# Patient Record
Sex: MALE | Race: OTHER | Employment: FULL TIME | URBAN - NONMETROPOLITAN AREA
[De-identification: names, ages, dates, MRNs, and addresses within clinical notes are randomized per-mention and may not be internally consistent; named-entity substitution may affect disease eponyms.]

---

## 2021-09-27 ENCOUNTER — HOSPITAL ENCOUNTER (INPATIENT)
Age: 36
LOS: 4 days | Discharge: HOME | DRG: 720 | End: 2021-10-01
Attending: EMERGENCY MEDICINE | Admitting: HOSPITALIST
Payer: MEDICAID

## 2021-09-27 ENCOUNTER — APPOINTMENT (OUTPATIENT)
Dept: GENERAL RADIOLOGY | Age: 36
DRG: 720 | End: 2021-09-27
Payer: MEDICAID

## 2021-09-27 DIAGNOSIS — U07.1 ACUTE RESPIRATORY FAILURE DUE TO COVID-19 (HCC): Primary | ICD-10-CM

## 2021-09-27 DIAGNOSIS — J96.00 ACUTE RESPIRATORY FAILURE DUE TO COVID-19 (HCC): Primary | ICD-10-CM

## 2021-09-27 LAB
ALBUMIN SERPL-MCNC: 4 G/DL (ref 3.5–5.1)
ALP BLD-CCNC: 72 U/L (ref 38–126)
ALT SERPL-CCNC: 37 U/L (ref 11–66)
ANION GAP SERPL CALCULATED.3IONS-SCNC: 13 MEQ/L (ref 8–16)
AST SERPL-CCNC: 30 U/L (ref 5–40)
BACTERIA: ABNORMAL /HPF
BASOPHILS # BLD: 0.1 %
BASOPHILS ABSOLUTE: 0 THOU/MM3 (ref 0–0.1)
BILIRUB SERPL-MCNC: 0.6 MG/DL (ref 0.3–1.2)
BILIRUBIN DIRECT: < 0.2 MG/DL (ref 0–0.3)
BILIRUBIN URINE: NEGATIVE
BLOOD, URINE: NEGATIVE
BUN BLDV-MCNC: 19 MG/DL (ref 7–22)
C-REACTIVE PROTEIN: 2.51 MG/DL (ref 0–1)
CALCIUM SERPL-MCNC: 9 MG/DL (ref 8.5–10.5)
CASTS 2: ABNORMAL /LPF
CASTS UA: ABNORMAL /LPF
CHARACTER, URINE: CLEAR
CHLORIDE BLD-SCNC: 100 MEQ/L (ref 98–111)
CO2: 24 MEQ/L (ref 23–33)
COLOR: YELLOW
CREAT SERPL-MCNC: 0.7 MG/DL (ref 0.4–1.2)
CRYSTALS, UA: ABNORMAL
EOSINOPHIL # BLD: 0 %
EOSINOPHILS ABSOLUTE: 0 THOU/MM3 (ref 0–0.4)
EPITHELIAL CELLS, UA: ABNORMAL /HPF
ERYTHROCYTE [DISTWIDTH] IN BLOOD BY AUTOMATED COUNT: 12.6 % (ref 11.5–14.5)
ERYTHROCYTE [DISTWIDTH] IN BLOOD BY AUTOMATED COUNT: 40.4 FL (ref 35–45)
FERRITIN: 916 NG/ML (ref 22–322)
GFR SERPL CREATININE-BSD FRML MDRD: > 90 ML/MIN/1.73M2
GLUCOSE BLD-MCNC: 183 MG/DL (ref 70–108)
GLUCOSE URINE: NEGATIVE MG/DL
HCT VFR BLD CALC: 47.5 % (ref 42–52)
HEMOGLOBIN: 16.7 GM/DL (ref 14–18)
IMMATURE GRANS (ABS): 0.05 THOU/MM3 (ref 0–0.07)
IMMATURE GRANULOCYTES: 0.5 %
KETONES, URINE: NEGATIVE
LACTIC ACID, SEPSIS: 1.7 MMOL/L (ref 0.5–1.9)
LACTIC ACID, SEPSIS: 2.6 MMOL/L (ref 0.5–1.9)
LACTIC ACID: 3.4 MMOL/L (ref 0.5–2)
LD: 357 U/L (ref 100–190)
LEUKOCYTE ESTERASE, URINE: NEGATIVE
LYMPHOCYTES # BLD: 7.4 %
LYMPHOCYTES ABSOLUTE: 0.7 THOU/MM3 (ref 1–4.8)
MCH RBC QN AUTO: 30.8 PG (ref 26–33)
MCHC RBC AUTO-ENTMCNC: 35.2 GM/DL (ref 32.2–35.5)
MCV RBC AUTO: 87.5 FL (ref 80–94)
MISCELLANEOUS 2: ABNORMAL
MONOCYTES # BLD: 4.1 %
MONOCYTES ABSOLUTE: 0.4 THOU/MM3 (ref 0.4–1.3)
NITRITE, URINE: NEGATIVE
NUCLEATED RED BLOOD CELLS: 0 /100 WBC
OSMOLALITY CALCULATION: 280.8 MOSMOL/KG (ref 275–300)
PH UA: 8.5 (ref 5–9)
PLATELET # BLD: 165 THOU/MM3 (ref 130–400)
PMV BLD AUTO: 10.4 FL (ref 9.4–12.4)
POTASSIUM REFLEX MAGNESIUM: 3.9 MEQ/L (ref 3.5–5.2)
PRO-BNP: 37.2 PG/ML (ref 0–450)
PROCALCITONIN: 0.08 NG/ML (ref 0.01–0.09)
PROTEIN UA: 30
RBC # BLD: 5.43 MILL/MM3 (ref 4.7–6.1)
RBC URINE: ABNORMAL /HPF
RENAL EPITHELIAL, UA: ABNORMAL
SARS-COV-2, NAAT: DETECTED
SEG NEUTROPHILS: 87.9 %
SEGMENTED NEUTROPHILS ABSOLUTE COUNT: 8.9 THOU/MM3 (ref 1.8–7.7)
SODIUM BLD-SCNC: 137 MEQ/L (ref 135–145)
SPECIFIC GRAVITY, URINE: 1.02 (ref 1–1.03)
TOTAL PROTEIN: 7.7 G/DL (ref 6.1–8)
TROPONIN T: < 0.01 NG/ML
UROBILINOGEN, URINE: 1 EU/DL (ref 0–1)
WBC # BLD: 10.1 THOU/MM3 (ref 4.8–10.8)
WBC UA: ABNORMAL /HPF
YEAST: ABNORMAL

## 2021-09-27 PROCEDURE — 36415 COLL VENOUS BLD VENIPUNCTURE: CPT

## 2021-09-27 PROCEDURE — 87635 SARS-COV-2 COVID-19 AMP PRB: CPT

## 2021-09-27 PROCEDURE — 83615 LACTATE (LD) (LDH) ENZYME: CPT

## 2021-09-27 PROCEDURE — 80076 HEPATIC FUNCTION PANEL: CPT

## 2021-09-27 PROCEDURE — 80048 BASIC METABOLIC PNL TOTAL CA: CPT

## 2021-09-27 PROCEDURE — 99232 SBSQ HOSP IP/OBS MODERATE 35: CPT | Performed by: PHYSICIAN ASSISTANT

## 2021-09-27 PROCEDURE — 83605 ASSAY OF LACTIC ACID: CPT

## 2021-09-27 PROCEDURE — 85025 COMPLETE CBC W/AUTO DIFF WBC: CPT

## 2021-09-27 PROCEDURE — 81001 URINALYSIS AUTO W/SCOPE: CPT

## 2021-09-27 PROCEDURE — 84484 ASSAY OF TROPONIN QUANT: CPT

## 2021-09-27 PROCEDURE — 82728 ASSAY OF FERRITIN: CPT

## 2021-09-27 PROCEDURE — 2580000003 HC RX 258: Performed by: EMERGENCY MEDICINE

## 2021-09-27 PROCEDURE — 86140 C-REACTIVE PROTEIN: CPT

## 2021-09-27 PROCEDURE — 99285 EMERGENCY DEPT VISIT HI MDM: CPT

## 2021-09-27 PROCEDURE — 1200000003 HC TELEMETRY R&B

## 2021-09-27 PROCEDURE — 6360000002 HC RX W HCPCS: Performed by: PHYSICIAN ASSISTANT

## 2021-09-27 PROCEDURE — 6370000000 HC RX 637 (ALT 250 FOR IP): Performed by: PHYSICIAN ASSISTANT

## 2021-09-27 PROCEDURE — 84145 PROCALCITONIN (PCT): CPT

## 2021-09-27 PROCEDURE — 83880 ASSAY OF NATRIURETIC PEPTIDE: CPT

## 2021-09-27 PROCEDURE — 93005 ELECTROCARDIOGRAM TRACING: CPT | Performed by: EMERGENCY MEDICINE

## 2021-09-27 PROCEDURE — 96374 THER/PROPH/DIAG INJ IV PUSH: CPT

## 2021-09-27 PROCEDURE — 6360000002 HC RX W HCPCS: Performed by: EMERGENCY MEDICINE

## 2021-09-27 PROCEDURE — 71045 X-RAY EXAM CHEST 1 VIEW: CPT

## 2021-09-27 RX ORDER — MELOXICAM 7.5 MG/1
15 TABLET ORAL DAILY
Status: DISCONTINUED | OUTPATIENT
Start: 2021-09-27 | End: 2021-10-01 | Stop reason: HOSPADM

## 2021-09-27 RX ORDER — DEXAMETHASONE SODIUM PHOSPHATE 4 MG/ML
10 INJECTION, SOLUTION INTRA-ARTICULAR; INTRALESIONAL; INTRAMUSCULAR; INTRAVENOUS; SOFT TISSUE ONCE
Status: COMPLETED | OUTPATIENT
Start: 2021-09-27 | End: 2021-09-27

## 2021-09-27 RX ORDER — DEXAMETHASONE SODIUM PHOSPHATE 4 MG/ML
6 INJECTION, SOLUTION INTRA-ARTICULAR; INTRALESIONAL; INTRAMUSCULAR; INTRAVENOUS; SOFT TISSUE EVERY 24 HOURS
Status: DISCONTINUED | OUTPATIENT
Start: 2021-09-28 | End: 2021-09-29

## 2021-09-27 RX ORDER — CYCLOBENZAPRINE HCL 10 MG
10 TABLET ORAL 3 TIMES DAILY PRN
COMMUNITY

## 2021-09-27 RX ORDER — CYCLOBENZAPRINE HCL 10 MG
10 TABLET ORAL 3 TIMES DAILY PRN
Status: DISCONTINUED | OUTPATIENT
Start: 2021-09-27 | End: 2021-10-01 | Stop reason: HOSPADM

## 2021-09-27 RX ORDER — ACETAMINOPHEN 325 MG/1
650 TABLET ORAL EVERY 6 HOURS PRN
Status: DISCONTINUED | OUTPATIENT
Start: 2021-09-27 | End: 2021-10-01 | Stop reason: HOSPADM

## 2021-09-27 RX ORDER — DEXAMETHASONE 4 MG/1
4 TABLET ORAL 2 TIMES DAILY WITH MEALS
Status: ON HOLD | COMMUNITY
End: 2021-10-01 | Stop reason: HOSPADM

## 2021-09-27 RX ORDER — MULTIVIT-MIN/IRON/FOLIC ACID/K 18-600-40
CAPSULE ORAL
COMMUNITY

## 2021-09-27 RX ORDER — AZITHROMYCIN 250 MG/1
250 TABLET, FILM COATED ORAL DAILY
Status: COMPLETED | OUTPATIENT
Start: 2021-09-28 | End: 2021-09-29

## 2021-09-27 RX ORDER — ZINC SULFATE 50(220)MG
50 CAPSULE ORAL DAILY
Status: DISCONTINUED | OUTPATIENT
Start: 2021-09-27 | End: 2021-10-01 | Stop reason: HOSPADM

## 2021-09-27 RX ORDER — ASCORBIC ACID 500 MG
500 TABLET ORAL DAILY
Status: DISCONTINUED | OUTPATIENT
Start: 2021-09-27 | End: 2021-10-01 | Stop reason: HOSPADM

## 2021-09-27 RX ORDER — MELOXICAM 15 MG/1
15 TABLET ORAL DAILY
COMMUNITY

## 2021-09-27 RX ORDER — ACETAMINOPHEN 650 MG/1
650 SUPPOSITORY RECTAL EVERY 6 HOURS PRN
Status: DISCONTINUED | OUTPATIENT
Start: 2021-09-27 | End: 2021-10-01 | Stop reason: HOSPADM

## 2021-09-27 RX ORDER — VITAMIN B COMPLEX
2000 TABLET ORAL DAILY
Status: DISCONTINUED | OUTPATIENT
Start: 2021-09-27 | End: 2021-10-01 | Stop reason: HOSPADM

## 2021-09-27 RX ORDER — AZITHROMYCIN 250 MG/1
250 TABLET, FILM COATED ORAL DAILY
Status: ON HOLD | COMMUNITY
End: 2021-10-01 | Stop reason: HOSPADM

## 2021-09-27 RX ORDER — SODIUM CHLORIDE 9 MG/ML
1000 INJECTION, SOLUTION INTRAVENOUS CONTINUOUS
Status: DISCONTINUED | OUTPATIENT
Start: 2021-09-27 | End: 2021-09-28

## 2021-09-27 RX ADMIN — Medication 2000 UNITS: at 20:34

## 2021-09-27 RX ADMIN — BARICITINIB 4 MG: 2 TABLET, FILM COATED ORAL at 20:34

## 2021-09-27 RX ADMIN — MELOXICAM 15 MG: 7.5 TABLET ORAL at 20:34

## 2021-09-27 RX ADMIN — SODIUM CHLORIDE 1000 ML: 9 INJECTION, SOLUTION INTRAVENOUS at 11:42

## 2021-09-27 RX ADMIN — OXYCODONE HYDROCHLORIDE AND ACETAMINOPHEN 500 MG: 500 TABLET ORAL at 20:34

## 2021-09-27 RX ADMIN — ENOXAPARIN SODIUM 30 MG: 30 INJECTION SUBCUTANEOUS at 20:35

## 2021-09-27 RX ADMIN — Medication 50 MG: at 20:34

## 2021-09-27 RX ADMIN — DEXAMETHASONE SODIUM PHOSPHATE 10 MG: 4 INJECTION, SOLUTION INTRAMUSCULAR; INTRAVENOUS at 11:42

## 2021-09-27 ASSESSMENT — ENCOUNTER SYMPTOMS
NAUSEA: 0
SHORTNESS OF BREATH: 1
SINUS PRESSURE: 0
VOMITING: 0
ABDOMINAL PAIN: 0
BACK PAIN: 0
COUGH: 1
COLOR CHANGE: 0
CHEST TIGHTNESS: 0
SORE THROAT: 0
EYE REDNESS: 0
PHOTOPHOBIA: 0

## 2021-09-27 ASSESSMENT — PAIN SCALES - GENERAL: PAINLEVEL_OUTOF10: 0

## 2021-09-27 NOTE — ED NOTES
ED to inpatient nurses report    Chief Complaint   Patient presents with    Shortness of Breath      Present to ED from home  LOC: alert and orientated to name, place, date  Vital signs   Vitals:    09/27/21 1043 09/27/21 1052 09/27/21 1141   BP: (!) 149/83 128/85 125/83   Pulse: 102 105 99   Resp: (!) 32 28 25   Temp: 98.8 °F (37.1 °C)     TempSrc: Oral     SpO2: (!) 86% 93% 94%   Weight:  200 lb (90.7 kg)    Height:  5' 7\" (1.702 m)       Oxygen Baseline room air     Current needs required 3L per NC  Bipap/Cpap No  LDAs:   Peripheral IV 09/27/21 Left Forearm (Active)   Site Assessment Clean;Dry; Intact 09/27/21 1053   Line Status Blood return noted; Flushed;Normal saline locked;Specimen collected 09/27/21 1053   Dressing Status Clean;Dry; Intact 09/27/21 1053     Mobility: Requires assistance * 1  Pending ED orders: n/a   Present condition: pt stable   Person of contract Lisa Novak , phone number [phone]  Our promise was given to patient    Electronically signed by Ester Finney RN on 9/27/2021 at 12:22 PM     Ester Finney RN  09/27/21 8051 [facility]  09/27/21 1542

## 2021-09-27 NOTE — H&P
Hospitalist History & Physical    Patient:  [facility]    Unit/Bed:01/001A  YOB: 1985  MRN: [id number]   Acct: [de-identified]   PCP: RIKKI Hernandez CNP  Code Status: No Order    Date of Service: Pt seen/examined on 09/27/21 and admitted to Inpatient with expected LOS greater than two midnights due to medical therapy. Chief Complaint: SOB    Assessment/Plan:    1. Acute hypoxic respiratory failure d/t COVID-19 pneumonia:   Tested positive on 9/27  Dexamethasone 6mg daily (start date 9/27; Day 1/10)  Vit C/D/Zinc, ASA. BID Lovenox. Pt is not a candidate for Regeneron d/t hypoxia. Symptom onset > 7 days ago - does not meet criteria for Remdesivir  Elevated inflammatory markers - Baricitnib 4mg daily (start date 9/27; Day 1/14). Cough suppressants, albuterol nebs, IS. Wean O2 as tolerated. Currently on 4L NC. History of Present Illness: This is a 31year old male with no significant PMHx who presented to [facility] with SOB and found to be positive for COVID-19. Patient states that his symptoms started approx 8 days ago with cough, fever/chills, fatigue, body aches. He states that this morning he suddenly became very short of breath and came to the hospital. Denies chest pain, abd pain, n/v/d, urinary sx. Upon arrival to the ED, pts SpO2 was 86% and he was placed on 3L NC. Patient will be admitted to hospitalist service for further management. Review of Systems: Pertinent positives as noted in the HPI. All other systems reviewed and negative. Past Medical History:    History reviewed. No pertinent past medical history. Past Surgical History:    History reviewed. No pertinent surgical history. Home Medications:   No current facility-administered medications on file prior to encounter.      Current Outpatient Medications on File Prior to Encounter   Medication Sig Dispense Refill    meloxicam (MOBIC) 15 MG tablet Take 15 mg by mouth daily      Cholecalciferol

## 2021-09-27 NOTE — ED NOTES
Patient resting on cot with eyes closed, respirations are easy and unlabored. No needs at this time.       Bronwyn Sampson RN  09/27/21 9620

## 2021-09-27 NOTE — ED PROVIDER NOTES
[place] ENCOUNTER          Pt Name: Chris Amaya  MRN: [id number]  [place] 1985  Date of evaluation: 9/27/2021  Emergency Physician: Aditya Rojas DO    CHIEF COMPLAINT       Chief Complaint   Patient presents with    Shortness of Breath     History obtained from the patient and his wife. HISTORY OF PRESENT ILLNESS    HPI  Chris Amaya is a [address] y.o. male who presents to the emergency department for evaluation of shortness of breath. Patient with COVID-19 for the last 7 days. He reports being seen by primary care and [facility] 2 days ago. He had been started on dexamethasone Zithromax vitamin C and vitamin D. He reports today he has been having worsening shortness of breath and difficulty breathing. He states he gets into coughing spells in which he cannot catch his breath and becomes more short of breath. Cough is nonproductive. No hemoptysis. Reports fatigue and shortness of breath with exertion. Denies pain. Upon arrival to the ED patient's SPO2 was 86% on room air. The patient has no other acute complaints at this time. REVIEW OF SYSTEMS   Review of Systems   Constitutional: Negative for activity change, chills and fever. HENT: Negative for congestion, sinus pressure and sore throat. Eyes: Negative for photophobia, redness and visual disturbance. Respiratory: Positive for cough and shortness of breath. Negative for chest tightness. Cardiovascular: Negative for chest pain, palpitations and leg swelling. Gastrointestinal: Negative for abdominal pain, nausea and vomiting. Endocrine: Negative for polydipsia and polyuria. Genitourinary: Negative for decreased urine volume, difficulty urinating, dysuria, flank pain, frequency and urgency. Musculoskeletal: Negative for back pain, myalgias and neck pain. Skin: Negative for color change and rash.    Neurological: Negative for weakness and headaches. Hematological: Negative for adenopathy. Does not bruise/bleed easily. Psychiatric/Behavioral: Negative for confusion and self-injury. PAST MEDICAL AND SURGICAL HISTORY   History reviewed. No pertinent past medical history. History reviewed. No pertinent surgical history. MEDICATIONS     Current Facility-Administered Medications:     0.9 % sodium chloride infusion, 1,000 mL, IntraVENous, Continuous, Mouna Rotunda, DO    Dexamethasone Sodium Phosphate injection 10 mg, 10 mg, IntraVENous, Once, Mouna Rotunda, DO    Current Outpatient Medications:     meloxicam (MOBIC) 15 MG tablet, Take 15 mg by mouth daily, Disp: , Rfl:     Cholecalciferol (VITAMIN D) 50 MCG (2000 UT) CAPS capsule, Take by mouth, Disp: , Rfl:     cyclobenzaprine (FLEXERIL) 10 MG tablet, Take 10 mg by mouth 3 times daily as needed for Muscle spasms, Disp: , Rfl:     dexamethasone (DECADRON) 4 MG tablet, Take 4 mg by mouth 2 times daily (with meals), Disp: , Rfl:     azithromycin (ZITHROMAX) 250 MG tablet, Take 250 mg by mouth daily Follow direction on box, Disp: , Rfl:       SOCIAL HISTORY     Social History     Social History Narrative    Not on file     Social History     Tobacco Use    Smoking status: Never Smoker   Substance Use Topics    Alcohol use: Not Currently    Drug use: Not Currently         ALLERGIES   No Known Allergies      FAMILY HISTORY   History reviewed. No pertinent family history. PHYSICAL EXAM     ED Triage Vitals   BP Temp Temp Source Pulse Resp SpO2 Height Weight   09/27/21 1043 09/27/21 1043 09/27/21 1043 09/27/21 1043 09/27/21 1043 09/27/21 1043 09/27/21 1052 09/27/21 1052   (!) 149/83 98.8 °F (37.1 °C) Oral 102 (!) 32 (!) 86 % 5' 7\" (1.702 m) 200 lb (90.7 kg)         Additional Vital Signs:  Vitals:    09/27/21 1052   BP: 128/85   Pulse: 105   Resp: 28   Temp:    SpO2: 93%       Physical Exam  Vitals and nursing note reviewed.    Constitutional:       General: He is not in acute distress. Appearance: He is well-developed. He is not diaphoretic. HENT:      Head: Normocephalic. Eyes:      Pupils: Pupils are equal, round, and reactive to light. Neck:      Vascular: No JVD. Cardiovascular:      Rate and Rhythm: Normal rate and regular rhythm. Heart sounds: Normal heart sounds. Pulmonary:      Effort: Pulmonary effort is normal. Tachypnea present. No accessory muscle usage or respiratory distress. Breath sounds: Rales present. Abdominal:      General: Bowel sounds are normal. There is no distension. Palpations: Abdomen is soft. Tenderness: There is no abdominal tenderness. Musculoskeletal:         General: No tenderness or deformity. Normal range of motion. Cervical back: Normal range of motion and neck supple. Skin:     General: Skin is warm and dry. Capillary Refill: Capillary refill takes less than 2 seconds. Neurological:      Mental Status: He is alert and oriented to person, place, and time. Comments: Non-focal. Moves all extremities. Initial vital signs and nursing assessment reviewed and abnormal from Tachypnea. Pulsoximetry is abnormal per my interpretation. MEDICAL DECISION MAKING   Initial Assessment: Given the patient's above chief complaint and findings on history and physical examination, I thought it was appropriate to consider the following emergency medical conditions: Pneumonia, Covid pneumonia, bronchitis, pulmonary vascular pneumothorax, pulmonary edema although some of these diagnoses are unlikely they were considered in my medical decision making. Plan: CBC, BMP, ECG, troponin, BNP lactate, CRP symptomatic treatment with gentle hydration, Decadron and reassess   Unable to obtain [facility] COVID-19 swab. Will rapid Covid swab.     ED RESULTS   Laboratory results:  Labs Reviewed   CBC WITH AUTO DIFFERENTIAL - Abnormal; Notable for the following components:       Result Value    Segs Absolute 8.9 (*) Lymphocytes Absolute 0.7 (*)     All other components within normal limits   BASIC METABOLIC PANEL W/ REFLEX TO MG FOR LOW K - Abnormal; Notable for the following components:    Glucose 183 (*)     All other components within normal limits   LACTATE, SEPSIS - Abnormal; Notable for the following components:    Lactic Acid, Sepsis 2.6 (*)     All other components within normal limits   FERRITIN - Abnormal; Notable for the following components:    Ferritin 916 (*)     All other components within normal limits   C-REACTIVE PROTEIN - Abnormal; Notable for the following components:    CRP 2.51 (*)     All other components within normal limits   LACTATE DEHYDROGENASE - Abnormal; Notable for the following components:     (*)     All other components within normal limits   COVID-19, RAPID   HEPATIC FUNCTION PANEL   TROPONIN   BRAIN NATRIURETIC PEPTIDE   ANION GAP   GLOMERULAR FILTRATION RATE, ESTIMATED   OSMOLALITY   LACTATE, SEPSIS   URINE RT REFLEX TO CULTURE       Radiologic studies results:  XR CHEST PORTABLE   Final Result   1. Nodular peripheral predominant airspace opacities compatible with atypical infection. **This report has been created using voice recognition software. It may contain minor errors which are inherent in voice recognition technology. **      Final report electronically signed by Dr Geronimo Altman on 9/27/2021 11:27 AM          ED Medications administered this visit:   Medications   0.9 % sodium chloride infusion (has no administration in time range)   Dexamethasone Sodium Phosphate injection 10 mg (has no administration in time range)         ED COURSE    Patient with hypoxia tachypnea significantly improved with nasal cannula O2. Chest x-ray with bilateral infiltrates. Patient administered Decadron due to acute respiratory failure likely due to COVID-19. Patient's wife tested positive for COVID-19. Obtained rapid swab to confirm diagnosis as unable to confirm record.  Discussed case with hospitalist. Dr. Ariella Jaramillo accepted admission. The diagnosis, extensive differential diagnosis, laboratory and imaging findings were discussed at the bedside. The patient was an active participant in their care. They are agreeable to the plan of care. All questions and concerns were addressed at the time of the encounter. MEDICATION CHANGES     DISCHARGE MEDICATIONS:  New Prescriptions    No medications on file            FINAL DISPOSITION     Final diagnoses:   Acute respiratory failure due to COVID-19 [facility])     Condition: condition: fair  Dispo: Admit to telemetry    PATIENT REFERRED TO:  No follow-up provider specified. Critical Care Time   Critical Care Diagnosis. Acute respiratory failure with hypoxia. Requiring frequent monitoring and reassessment during ED course. There was a high probability of clinically significant/life threatening deterioration in this patient's condition which required my urgent intervention. Total critical care time was 20 minutes. This excludes any time for separately reportable procedures. This transcription was electronically signed. Parts of this transcriptions may have been dictated by use of voice recognition software and electronically transcribed, and parts may have been transcribed with the assistance of an ED scribe. The transcription may contain errors not detected in proofreading.     Electronically Signed: Nina Sanders DO, 09/27/21, 11:20 AM      Nina Sanders DO  09/27/21 4018

## 2021-09-28 LAB
ANION GAP SERPL CALCULATED.3IONS-SCNC: 15 MEQ/L (ref 8–16)
BUN BLDV-MCNC: 20 MG/DL (ref 7–22)
CALCIUM SERPL-MCNC: 9 MG/DL (ref 8.5–10.5)
CHLORIDE BLD-SCNC: 102 MEQ/L (ref 98–111)
CO2: 23 MEQ/L (ref 23–33)
CREAT SERPL-MCNC: 0.6 MG/DL (ref 0.4–1.2)
EKG ATRIAL RATE: 101 BPM
EKG P AXIS: 54 DEGREES
EKG P-R INTERVAL: 130 MS
EKG Q-T INTERVAL: 328 MS
EKG QRS DURATION: 104 MS
EKG QTC CALCULATION (BAZETT): 425 MS
EKG R AXIS: 59 DEGREES
EKG T AXIS: 53 DEGREES
EKG VENTRICULAR RATE: 101 BPM
ERYTHROCYTE [DISTWIDTH] IN BLOOD BY AUTOMATED COUNT: 12.7 % (ref 11.5–14.5)
ERYTHROCYTE [DISTWIDTH] IN BLOOD BY AUTOMATED COUNT: 41.6 FL (ref 35–45)
GFR SERPL CREATININE-BSD FRML MDRD: > 90 ML/MIN/1.73M2
GLUCOSE BLD-MCNC: 125 MG/DL (ref 70–108)
HCT VFR BLD CALC: 45.8 % (ref 42–52)
HEMOGLOBIN: 15.7 GM/DL (ref 14–18)
LACTIC ACID: 1 MMOL/L (ref 0.5–2)
MCH RBC QN AUTO: 30.7 PG (ref 26–33)
MCHC RBC AUTO-ENTMCNC: 34.3 GM/DL (ref 32.2–35.5)
MCV RBC AUTO: 89.5 FL (ref 80–94)
PLATELET # BLD: 166 THOU/MM3 (ref 130–400)
PMV BLD AUTO: 10.3 FL (ref 9.4–12.4)
POTASSIUM SERPL-SCNC: 4.2 MEQ/L (ref 3.5–5.2)
RBC # BLD: 5.12 MILL/MM3 (ref 4.7–6.1)
SODIUM BLD-SCNC: 140 MEQ/L (ref 135–145)
WBC # BLD: 13.8 THOU/MM3 (ref 4.8–10.8)

## 2021-09-28 PROCEDURE — 83605 ASSAY OF LACTIC ACID: CPT

## 2021-09-28 PROCEDURE — 36415 COLL VENOUS BLD VENIPUNCTURE: CPT

## 2021-09-28 PROCEDURE — 80048 BASIC METABOLIC PNL TOTAL CA: CPT

## 2021-09-28 PROCEDURE — 2700000000 HC OXYGEN THERAPY PER DAY

## 2021-09-28 PROCEDURE — 99232 SBSQ HOSP IP/OBS MODERATE 35: CPT | Performed by: PHYSICIAN ASSISTANT

## 2021-09-28 PROCEDURE — 2060000000 HC ICU INTERMEDIATE R&B

## 2021-09-28 PROCEDURE — 6370000000 HC RX 637 (ALT 250 FOR IP): Performed by: PHYSICIAN ASSISTANT

## 2021-09-28 PROCEDURE — 93010 ELECTROCARDIOGRAM REPORT: CPT | Performed by: INTERNAL MEDICINE

## 2021-09-28 PROCEDURE — 2580000003 HC RX 258: Performed by: PHYSICIAN ASSISTANT

## 2021-09-28 PROCEDURE — 1200000003 HC TELEMETRY R&B

## 2021-09-28 PROCEDURE — 94761 N-INVAS EAR/PLS OXIMETRY MLT: CPT

## 2021-09-28 PROCEDURE — 6360000002 HC RX W HCPCS: Performed by: PHYSICIAN ASSISTANT

## 2021-09-28 PROCEDURE — 85027 COMPLETE CBC AUTOMATED: CPT

## 2021-09-28 RX ORDER — IPRATROPIUM BROMIDE AND ALBUTEROL SULFATE 2.5; .5 MG/3ML; MG/3ML
1 SOLUTION RESPIRATORY (INHALATION) EVERY 4 HOURS PRN
Status: DISCONTINUED | OUTPATIENT
Start: 2021-09-28 | End: 2021-10-01 | Stop reason: HOSPADM

## 2021-09-28 RX ORDER — BENZONATATE 100 MG/1
100 CAPSULE ORAL 3 TIMES DAILY PRN
Status: DISCONTINUED | OUTPATIENT
Start: 2021-09-28 | End: 2021-10-01 | Stop reason: HOSPADM

## 2021-09-28 RX ORDER — SODIUM CHLORIDE 9 MG/ML
INJECTION, SOLUTION INTRAVENOUS CONTINUOUS
Status: DISCONTINUED | OUTPATIENT
Start: 2021-09-28 | End: 2021-10-01 | Stop reason: HOSPADM

## 2021-09-28 RX ADMIN — AZITHROMYCIN 250 MG: 250 TABLET, FILM COATED ORAL at 07:51

## 2021-09-28 RX ADMIN — ENOXAPARIN SODIUM 30 MG: 30 INJECTION SUBCUTANEOUS at 21:23

## 2021-09-28 RX ADMIN — OXYCODONE HYDROCHLORIDE AND ACETAMINOPHEN 500 MG: 500 TABLET ORAL at 07:51

## 2021-09-28 RX ADMIN — DEXAMETHASONE SODIUM PHOSPHATE 6 MG: 4 INJECTION, SOLUTION INTRA-ARTICULAR; INTRALESIONAL; INTRAMUSCULAR; INTRAVENOUS; SOFT TISSUE at 07:51

## 2021-09-28 RX ADMIN — GUAIFENESIN AND DEXTROMETHORPHAN HYDROBROMIDE 1 TABLET: 600; 30 TABLET, EXTENDED RELEASE ORAL at 21:23

## 2021-09-28 RX ADMIN — BARICITINIB 4 MG: 2 TABLET, FILM COATED ORAL at 21:23

## 2021-09-28 RX ADMIN — MELOXICAM 15 MG: 7.5 TABLET ORAL at 07:51

## 2021-09-28 RX ADMIN — Medication 2000 UNITS: at 07:51

## 2021-09-28 RX ADMIN — SODIUM CHLORIDE: 9 INJECTION, SOLUTION INTRAVENOUS at 18:45

## 2021-09-28 RX ADMIN — ENOXAPARIN SODIUM 30 MG: 30 INJECTION SUBCUTANEOUS at 07:51

## 2021-09-28 RX ADMIN — BENZONATATE 100 MG: 100 CAPSULE ORAL at 21:23

## 2021-09-28 RX ADMIN — Medication 50 MG: at 07:51

## 2021-09-28 ASSESSMENT — PAIN SCALES - GENERAL: PAINLEVEL_OUTOF10: 0

## 2021-09-28 NOTE — PROGRESS NOTES
Scheduled Medications    enoxaparin  30 mg SubCUTAneous BID    dexamethasone  6 mg IntraVENous Q24H    Vitamin D  2,000 Units Oral Daily    ascorbic acid  500 mg Oral Daily    zinc sulfate  50 mg Oral Daily    azithromycin  250 mg Oral Daily    meloxicam  15 mg Oral Daily    baricitinib  4 mg Oral Daily     PRN Meds: acetaminophen **OR** acetaminophen, cyclobenzaprine    I/O:   No intake or output data in the 24 hours ending 09/28/21 1831    Diet:  ADULT DIET; Regular    Exam:  /76   Pulse 90   Temp 97.6 °F (36.4 °C) (Oral)   Resp 20   Ht 5' 7\" (1.702 m)   Wt 179 lb 10.8 oz (81.5 kg)   SpO2 93%   BMI 28.14 kg/m²   General:   Acutely ill appearing male. NAD. NC in place. HEENT:  normocephalic and atraumatic. No scleral icterus. PERR. Neck: supple. No JVD. No thyromegaly. Lungs: clear to auscultation, diminished. No retractions  Cardiac: RRR without murmur. Abdomen: soft. Nontender. Bowel sounds positive. Extremities:  No clubbing, cyanosis, or edema x 4. Vasculature: capillary refill < 3 seconds. Palpable LE pulses bilaterally. Skin:  warm and dry. Psych:  Alert and oriented x3. Affect appropriate  Lymph:  No supraclavicular adenopathy. Neurologic:  No focal deficit. No seizures. Data: (All radiographs, tracings, PFTs, and imaging are personally viewed and interpreted unless otherwise noted)  Labs:   Recent Labs     09/27/21  1100 09/28/21  0905   WBC 10.1 13.8*   HGB 16.7 15.7   HCT 47.5 45.8    166     Recent Labs     09/27/21  1100 09/28/21  0905    140   K 3.9 4.2    102   CO2 24 23   BUN 19 20   CREATININE 0.7 0.6   CALCIUM 9.0 9.0     Recent Labs     09/27/21  1100   AST 30   ALT 37   BILIDIR <0.2   BILITOT 0.6   ALKPHOS 72     No results for input(s): INR in the last 72 hours. No results for input(s): Martha Kras in the last 72 hours.   Urinalysis:   Lab Results   Component Value Date    NITRU NEGATIVE 09/27/2021    WBCUA 0-2 09/27/2021 BACTERIA NONE SEEN 09/27/2021    RBCUA 0-2 09/27/2021    BLOODU NEGATIVE 09/27/2021    GLUCOSEU NEGATIVE 09/27/2021     Urine culture: No results found for: LABURIN  Micro:   Blood culture #1: No results found for: BC  Blood culture #2:No results found for: Salma Garcia  Organism:No results found for: ORG    No results found for: LABGRAM  MRSA culture only:No results found for: [facility]  Respiratory culture: No results found for: CULTRESP  Aerobic and Anaerobic :  No results found for: LABAERO  No results found for: [facility]    Radiology Reports:  XR CHEST PORTABLE   Final Result   1. Nodular peripheral predominant airspace opacities compatible with atypical infection. **This report has been created using voice recognition software. It may contain minor errors which are inherent in voice recognition technology. **      Final report electronically signed by Dr Tay Jones on 9/27/2021 11:27 AM        XR CHEST PORTABLE    Result Date: 9/27/2021  PROCEDURE: XR CHEST PORTABLE CLINICAL INFORMATION: covid, shortness of breath COMPARISON: No prior study. TECHNIQUE: AP portable chest radiograph performed. FINDINGS: LINES/TUBES/MECHANICAL DEVICES: None. CARDIAC SILHOUETTE: Cardiac and mediastinal contours are sharp. The trachea is not deviated. LUNGS: Nodular peripheral predominant airspace opacities OTHER: None. OSSEOUS STRUCTURES: 1. No acute osseous abnormality. 1. Nodular peripheral predominant airspace opacities compatible with atypical infection. **This report has been created using voice recognition software. It may contain minor errors which are inherent in voice recognition technology. ** Final report electronically signed by Dr Tay Jones on 9/27/2021 11:27 AM        Tele:   [] yes             [] no      Active Hospital Problems    Diagnosis Date Noted    Acute respiratory failure due to COVID-19 [facility]) [U07.1, J96.00] 09/27/2021       Electronically signed by Felisa Ash PA-C on 9/28/2021 at 6:31 PM

## 2021-09-28 NOTE — PROGRESS NOTES
Patients wife nadine was called and updated at this time on patient status. All questions and concerns addressed.

## 2021-09-29 ENCOUNTER — APPOINTMENT (OUTPATIENT)
Dept: GENERAL RADIOLOGY | Age: 36
DRG: 720 | End: 2021-09-29
Payer: MEDICAID

## 2021-09-29 LAB
ALBUMIN SERPL-MCNC: 3.5 G/DL (ref 3.5–5.1)
ALP BLD-CCNC: 68 U/L (ref 38–126)
ALT SERPL-CCNC: 50 U/L (ref 11–66)
ANION GAP SERPL CALCULATED.3IONS-SCNC: 9 MEQ/L (ref 8–16)
AST SERPL-CCNC: 34 U/L (ref 5–40)
BILIRUB SERPL-MCNC: 1.1 MG/DL (ref 0.3–1.2)
BILIRUBIN DIRECT: 0.4 MG/DL (ref 0–0.3)
BUN BLDV-MCNC: 19 MG/DL (ref 7–22)
C-REACTIVE PROTEIN: 7.38 MG/DL (ref 0–1)
CALCIUM SERPL-MCNC: 8.2 MG/DL (ref 8.5–10.5)
CHLORIDE BLD-SCNC: 103 MEQ/L (ref 98–111)
CO2: 24 MEQ/L (ref 23–33)
CREAT SERPL-MCNC: 0.6 MG/DL (ref 0.4–1.2)
D-DIMER QUANTITATIVE: 754 NG/ML FEU (ref 0–500)
ERYTHROCYTE [DISTWIDTH] IN BLOOD BY AUTOMATED COUNT: 12.8 % (ref 11.5–14.5)
ERYTHROCYTE [DISTWIDTH] IN BLOOD BY AUTOMATED COUNT: 42.9 FL (ref 35–45)
GFR SERPL CREATININE-BSD FRML MDRD: > 90 ML/MIN/1.73M2
GLUCOSE BLD-MCNC: 114 MG/DL (ref 70–108)
HCT VFR BLD CALC: 44.3 % (ref 42–52)
HEMOGLOBIN: 14.9 GM/DL (ref 14–18)
LACTIC ACID: 0.9 MMOL/L (ref 0.5–2)
MCH RBC QN AUTO: 30.8 PG (ref 26–33)
MCHC RBC AUTO-ENTMCNC: 33.6 GM/DL (ref 32.2–35.5)
MCV RBC AUTO: 91.7 FL (ref 80–94)
PLATELET # BLD: 173 THOU/MM3 (ref 130–400)
PMV BLD AUTO: 10.3 FL (ref 9.4–12.4)
POTASSIUM SERPL-SCNC: 4.2 MEQ/L (ref 3.5–5.2)
PROCALCITONIN: 0.11 NG/ML (ref 0.01–0.09)
RBC # BLD: 4.83 MILL/MM3 (ref 4.7–6.1)
SODIUM BLD-SCNC: 136 MEQ/L (ref 135–145)
TOTAL PROTEIN: 6.2 G/DL (ref 6.1–8)
WBC # BLD: 12.7 THOU/MM3 (ref 4.8–10.8)

## 2021-09-29 PROCEDURE — 71045 X-RAY EXAM CHEST 1 VIEW: CPT

## 2021-09-29 PROCEDURE — 2580000003 HC RX 258: Performed by: PHYSICIAN ASSISTANT

## 2021-09-29 PROCEDURE — 82248 BILIRUBIN DIRECT: CPT

## 2021-09-29 PROCEDURE — 84145 PROCALCITONIN (PCT): CPT

## 2021-09-29 PROCEDURE — 86140 C-REACTIVE PROTEIN: CPT

## 2021-09-29 PROCEDURE — 2060000000 HC ICU INTERMEDIATE R&B

## 2021-09-29 PROCEDURE — 6360000002 HC RX W HCPCS: Performed by: PHYSICIAN ASSISTANT

## 2021-09-29 PROCEDURE — 99233 SBSQ HOSP IP/OBS HIGH 50: CPT | Performed by: PHYSICIAN ASSISTANT

## 2021-09-29 PROCEDURE — 83605 ASSAY OF LACTIC ACID: CPT

## 2021-09-29 PROCEDURE — 94761 N-INVAS EAR/PLS OXIMETRY MLT: CPT

## 2021-09-29 PROCEDURE — 85027 COMPLETE CBC AUTOMATED: CPT

## 2021-09-29 PROCEDURE — 36415 COLL VENOUS BLD VENIPUNCTURE: CPT

## 2021-09-29 PROCEDURE — 80053 COMPREHEN METABOLIC PANEL: CPT

## 2021-09-29 PROCEDURE — 85379 FIBRIN DEGRADATION QUANT: CPT

## 2021-09-29 PROCEDURE — 2700000000 HC OXYGEN THERAPY PER DAY

## 2021-09-29 PROCEDURE — 6370000000 HC RX 637 (ALT 250 FOR IP): Performed by: PHYSICIAN ASSISTANT

## 2021-09-29 PROCEDURE — 1200000000 HC SEMI PRIVATE

## 2021-09-29 RX ORDER — DEXAMETHASONE SODIUM PHOSPHATE 10 MG/ML
10 INJECTION, EMULSION INTRAMUSCULAR; INTRAVENOUS EVERY 24 HOURS
Status: DISCONTINUED | OUTPATIENT
Start: 2021-10-04 | End: 2021-10-01 | Stop reason: HOSPADM

## 2021-09-29 RX ADMIN — BARICITINIB 4 MG: 2 TABLET, FILM COATED ORAL at 20:44

## 2021-09-29 RX ADMIN — ENOXAPARIN SODIUM 30 MG: 30 INJECTION SUBCUTANEOUS at 20:44

## 2021-09-29 RX ADMIN — DEXAMETHASONE SODIUM PHOSPHATE 6 MG: 4 INJECTION, SOLUTION INTRA-ARTICULAR; INTRALESIONAL; INTRAMUSCULAR; INTRAVENOUS; SOFT TISSUE at 08:22

## 2021-09-29 RX ADMIN — AZITHROMYCIN 250 MG: 250 TABLET, FILM COATED ORAL at 08:22

## 2021-09-29 RX ADMIN — Medication 50 MG: at 08:22

## 2021-09-29 RX ADMIN — Medication 2000 UNITS: at 08:22

## 2021-09-29 RX ADMIN — ACETAMINOPHEN 650 MG: 325 TABLET ORAL at 10:13

## 2021-09-29 RX ADMIN — OXYCODONE HYDROCHLORIDE AND ACETAMINOPHEN 500 MG: 500 TABLET ORAL at 08:22

## 2021-09-29 RX ADMIN — ENOXAPARIN SODIUM 30 MG: 30 INJECTION SUBCUTANEOUS at 08:22

## 2021-09-29 RX ADMIN — DEXAMETHASONE SODIUM PHOSPHATE 20 MG: 4 INJECTION, SOLUTION INTRA-ARTICULAR; INTRALESIONAL; INTRAMUSCULAR; INTRAVENOUS; SOFT TISSUE at 12:17

## 2021-09-29 RX ADMIN — SODIUM CHLORIDE: 9 INJECTION, SOLUTION INTRAVENOUS at 15:31

## 2021-09-29 RX ADMIN — MELOXICAM 15 MG: 7.5 TABLET ORAL at 08:24

## 2021-09-29 ASSESSMENT — PAIN SCALES - GENERAL
PAINLEVEL_OUTOF10: 0

## 2021-09-29 NOTE — ACP (ADVANCE CARE PLANNING)
Advance Care Planning     Advance Care Planning Inpatient Note  [facility] Department    Today's Date: 9/29/2021  Unit: [facility] 8B    Received request from rounding. Upon review of chart and communication with care team, patient's decision making abilities are not in question. . Patient was/were present in the room during visit. Goals of ACP Conversation:  Discuss advance care planning documents  Facilitate a discussion related to patient's goals of care as they align with the patient's values and beliefs. Health Care Decision Makers:       Primary Decision Maker: Kirkmady Araseli - Spouse - [phone]    Summary:  Updated Healthcare Decision Maker    Advance Care Planning Documents (Patient Wishes):  None     Assessment:  Diana Griffin was lying in his bed watching TV with oxygen to assist with breathing. He was welcoming and freely engaged in conversation. * He was alert and oriented with decision-making capacity to express his wishes at this time. Interventions:  Provided education on documents for clarity and greater understanding  Deferred conversation as patient not interested in completing an advance directive at this time   *  explained documents, but he was not interested in completing them. * He named his wife as Primary Decision Maker, if needed. He is the father of 3 children. Care Preferences Communicated:  Ventilation:   If the patient, in their present state of health, suddenly became very ill and unable to breathe on their own, the patient would desire the use of a ventilator (breathing machine). If their health worsens and it becomes clear that the change of recovery is unlikely, the patient would desire the use of a ventilator (breathing machine). Resuscitation:  In the event the patient's heart stopped as a result of an underlying serious health condition, the patient communicates a preference for  resuscitative attempts (CPR).     Outcomes/Plan:  ACP Discussion: Completed    Electronically signed by Tyrone Robles on 9/29/2021 at 3:01 PM

## 2021-09-29 NOTE — CARE COORDINATION
9/29/21, 1:02 PM EDT    [address] [facility] day: 2  Location: [facility]26/026-A Reason for admit: Acute respiratory failure due to COVID-19 [facility]) [U07.1, J96.00]   Procedure:   09/27 CXR:  Nodular peripheral predominant airspace opacities compatible with atypical infection. 09/29 CXR: Stable airspace disease, no acute changes. Barriers to Discharge: O2 6L/min and  placed on HHF oxygen 40 L/min, 60 % FiO2. Sats 91-93%. Tylenol with Tmax 101.9 (R).  mL/hr, Vitamin C, Vit D, Zinc. Baricitinib, Tessalon for cough. Decadron IV, Mucinex,   Lovenox, med nebs, telemetry SR 86. PCP: RIKKI Hernandez CNP  Readmission Risk Score: 8%  Patient Goals/Plan/Treatment Preferences: + covid 09/27. Plans home with wife; following for needs. SR [facility] for oxygen needs, [facility] [employer] with  following.

## 2021-09-29 NOTE — PROGRESS NOTES
Hospitalist Progress Note      Patient:  Governor Gilliland    Unit/Bed:8B-26/026-A  YOB: 1985  MRN: [id number]   Acct: [de-identified]   PCP: RIKKI Gilbert CNP  Date of Admission: 9/27/2021    Assessment/Plan:    COVID 19: Patient tested positive for COVID-19 on 9/27. Pt met inpatient criteria. CRP level is 2.51, 7.38. Pt was started on Decadron & Baricitinib on 9/27. Pharmacy consulted to help with dosages of these medications. Vitamins & supplements started. Acute respiratory failure, hypoxia: Patient's baseline is room air. Patient is currently requiring [address] 40L & 50% of oxygen. Incentive spirometry. Acapella. Chief Complaint: SOB    Initial H and P:-    Initial H&P \"This is a 31year old male with no significant PMHx who presented to [facility] with SOB and found to be positive for COVID-19. Patient states that his symptoms started approx 8 days ago with cough, fever/chills, fatigue, body aches. He states that this morning he suddenly became very short of breath and came to the hospital. Denies chest pain, abd pain, n/v/d, urinary sx. Upon arrival to the ED, pts SpO2 was 86% and he was placed on 3L NC. Patient will be admitted to hospitalist service for further management.  \"     Subjective (past 24 hours):   No acute events overnight. Patient spiking fever this afternoon. Patient using incentive spirometer and Acapella. Patient states that he is very tired and weak. Past medical history, family history, social history and allergies reviewed again and is unchanged since admission. ROS (All review of systems completed. Pertinent positives noted.  Otherwise All other systems reviewed and negative.)     Medications:  Reviewed    Infusion Medications    sodium chloride 100 mL/hr at 09/28/21 1845     Scheduled Medications    dexamethasone  20 mg IntraVENous Q24H    Followed by   Bakari Casey ON 10/4/2021] dexamethasone  10 mg IntraVENous Q24H    enoxaparin  30 mg SubCUTAneous BID    Vitamin D  2,000 Units Oral Daily    ascorbic acid  500 mg Oral Daily    zinc sulfate  50 mg Oral Daily    meloxicam  15 mg Oral Daily    baricitinib  4 mg Oral Daily     PRN Meds: benzonatate, dextromethorphan-guaiFENesin, ipratropium-albuterol, acetaminophen **OR** acetaminophen, cyclobenzaprine      Intake/Output Summary (Last 24 hours) at 9/29/2021 1506  Last data filed at 9/29/2021 1318  Gross per 24 hour   Intake 360 ml   Output --   Net 360 ml       Diet:  ADULT DIET; Regular    Exam:  /71   Pulse 86   Temp 98.1 °F (36.7 °C)   Resp 18   Ht 5' 7\" (1.702 m)   Wt 179 lb 10.8 oz (81.5 kg)   SpO2 92%   BMI 28.14 kg/m²   General appearance: No apparent distress, appears stated age and cooperative. HEENT: Pupils equal, round, and reactive to light. Conjunctivae/corneas clear. Neck: Supple, with full range of motion. No jugular venous distention. Trachea midline. Respiratory:  Normal respiratory effort on HFNC. Breath sounds diminished. Cardiovascular: Regular rate and rhythm with normal S1/S2 without murmurs, rubs or gallops. Abdomen: Soft, non-tender, non-distended with normal bowel sounds. Musculoskeletal: passive and active ROM x 4 extremities. Skin: Skin color, texture, turgor normal.  No rashes or lesions. Neurologic:  Neurovascularly intact without any focal sensory/motor deficits.  Cranial nerves: II-XII intact, grossly non-focal.  Psychiatric: Alert and oriented, thought content appropriate, normal insight  Capillary Refill: Brisk,< 3 seconds   Peripheral Pulses: +2 palpable, equal bilaterally     Labs:   Recent Labs     09/27/21  1100 09/28/21  0905 09/29/21  0645   WBC 10.1 13.8* 12.7*   HGB 16.7 15.7 14.9   HCT 47.5 45.8 44.3    166 173     Recent Labs     09/27/21  1100 09/28/21  0905 09/29/21  0645    140 136   K 3.9 4.2 4.2    102 103   CO2 24 23 24   BUN 19 20 19   CREATININE 0.7 0.6 0.6   CALCIUM 9.0 9.0 8.2*     Recent Labs     09/27/21  1100 09/29/21  0645   AST 30 34   ALT 37 50   BILIDIR <0.2 0.4*   BILITOT 0.6 1.1   ALKPHOS 72 68     Urinalysis:      Lab Results   Component Value Date    NITRU NEGATIVE 09/27/2021    WBCUA 0-2 09/27/2021    BACTERIA NONE SEEN 09/27/2021    RBCUA 0-2 09/27/2021    BLOODU NEGATIVE 09/27/2021    GLUCOSEU NEGATIVE 09/27/2021       Radiology:  XR CHEST PORTABLE   Final Result   1. Stable airspace disease, no acute changes. **This report has been created using voice recognition software. It may contain minor errors which are inherent in voice recognition technology. **      Final report electronically signed by Dr Victor Manuel Kunz on 9/29/2021 11:57 AM      XR CHEST PORTABLE   Final Result   1. Nodular peripheral predominant airspace opacities compatible with atypical infection. **This report has been created using voice recognition software. It may contain minor errors which are inherent in voice recognition technology. **      Final report electronically signed by Dr Victor Manuel Kunz on 9/27/2021 11:27 AM            Electronically signed by REJI Lopez on 9/29/2021 at 3:06 PM

## 2021-09-30 LAB
ANION GAP SERPL CALCULATED.3IONS-SCNC: 9 MEQ/L (ref 8–16)
BUN BLDV-MCNC: 17 MG/DL (ref 7–22)
C-REACTIVE PROTEIN: 8.43 MG/DL (ref 0–1)
CALCIUM SERPL-MCNC: 8.9 MG/DL (ref 8.5–10.5)
CHLORIDE BLD-SCNC: 104 MEQ/L (ref 98–111)
CO2: 25 MEQ/L (ref 23–33)
CREAT SERPL-MCNC: 0.6 MG/DL (ref 0.4–1.2)
ERYTHROCYTE [DISTWIDTH] IN BLOOD BY AUTOMATED COUNT: 12.5 % (ref 11.5–14.5)
ERYTHROCYTE [DISTWIDTH] IN BLOOD BY AUTOMATED COUNT: 41.4 FL (ref 35–45)
GFR SERPL CREATININE-BSD FRML MDRD: > 90 ML/MIN/1.73M2
GLUCOSE BLD-MCNC: 175 MG/DL (ref 70–108)
HCT VFR BLD CALC: 43.9 % (ref 42–52)
HEMOGLOBIN: 14.8 GM/DL (ref 14–18)
MCH RBC QN AUTO: 30.2 PG (ref 26–33)
MCHC RBC AUTO-ENTMCNC: 33.7 GM/DL (ref 32.2–35.5)
MCV RBC AUTO: 89.6 FL (ref 80–94)
PLATELET # BLD: 213 THOU/MM3 (ref 130–400)
PMV BLD AUTO: 10.5 FL (ref 9.4–12.4)
POTASSIUM REFLEX MAGNESIUM: 4.1 MEQ/L (ref 3.5–5.2)
RBC # BLD: 4.9 MILL/MM3 (ref 4.7–6.1)
SODIUM BLD-SCNC: 138 MEQ/L (ref 135–145)
WBC # BLD: 9.3 THOU/MM3 (ref 4.8–10.8)

## 2021-09-30 PROCEDURE — 85027 COMPLETE CBC AUTOMATED: CPT

## 2021-09-30 PROCEDURE — 2060000000 HC ICU INTERMEDIATE R&B

## 2021-09-30 PROCEDURE — 99233 SBSQ HOSP IP/OBS HIGH 50: CPT | Performed by: PHYSICIAN ASSISTANT

## 2021-09-30 PROCEDURE — 86140 C-REACTIVE PROTEIN: CPT

## 2021-09-30 PROCEDURE — 6360000002 HC RX W HCPCS: Performed by: PHYSICIAN ASSISTANT

## 2021-09-30 PROCEDURE — 6370000000 HC RX 637 (ALT 250 FOR IP): Performed by: PHYSICIAN ASSISTANT

## 2021-09-30 PROCEDURE — 36415 COLL VENOUS BLD VENIPUNCTURE: CPT

## 2021-09-30 PROCEDURE — 80048 BASIC METABOLIC PNL TOTAL CA: CPT

## 2021-09-30 PROCEDURE — 2700000000 HC OXYGEN THERAPY PER DAY

## 2021-09-30 PROCEDURE — 2580000003 HC RX 258: Performed by: PHYSICIAN ASSISTANT

## 2021-09-30 RX ADMIN — OXYCODONE HYDROCHLORIDE AND ACETAMINOPHEN 500 MG: 500 TABLET ORAL at 07:28

## 2021-09-30 RX ADMIN — BARICITINIB 4 MG: 2 TABLET, FILM COATED ORAL at 20:36

## 2021-09-30 RX ADMIN — DEXAMETHASONE SODIUM PHOSPHATE 20 MG: 4 INJECTION, SOLUTION INTRA-ARTICULAR; INTRALESIONAL; INTRAMUSCULAR; INTRAVENOUS; SOFT TISSUE at 11:19

## 2021-09-30 RX ADMIN — ENOXAPARIN SODIUM 30 MG: 30 INJECTION SUBCUTANEOUS at 20:36

## 2021-09-30 RX ADMIN — Medication 50 MG: at 07:28

## 2021-09-30 RX ADMIN — MELOXICAM 15 MG: 7.5 TABLET ORAL at 07:28

## 2021-09-30 RX ADMIN — ENOXAPARIN SODIUM 30 MG: 30 INJECTION SUBCUTANEOUS at 07:28

## 2021-09-30 RX ADMIN — Medication 2000 UNITS: at 07:28

## 2021-09-30 ASSESSMENT — PAIN SCALES - GENERAL: PAINLEVEL_OUTOF10: 0

## 2021-09-30 NOTE — CARE COORDINATION
9/30/21, 2:32 PM EDT    DISCHARGE  E [facility] day: 3  Location: [facility]26/026-A Reason for admit: Acute respiratory failure due to [facility] ([facility] [U07.1, J96.00]   Procedure: na  Barriers to Discharge: moved to room 8B26 with air conditioning not working in room 25. IS and Acapella, oxygen at 6L/min with sats 92%, IVF, Vitamin C, Vit D, Zinc, Baricitinib, Tessalon, Flexeril, Decadron IV, Mucinex, Lovenox, med nebs. PCP: RIKKI Melo CNP  Readmission Risk Score: 7%  Patient Goals/Plan/Treatment Preferences: from home with wife; following for home O2 needs. Will use [employer] with [address]

## 2021-09-30 NOTE — PROGRESS NOTES
Hospitalist Progress Note      Patient:  Christa Serum    Unit/Bed:8B-26/026-A  YOB: 1985  MRN: [id number]   Acct: [de-identified]   PCP: RIKKI Hernandez CNP  Date of Admission: 9/27/2021    Assessment/Plan:    COVID 19: Patient tested positive for COVID-19 on 9/27. Pt met inpatient criteria. CRP level is 2.51, 7.38, 8.43. Pt was started on Decadron & Baricitinib on 9/27. Pharmacy consulted to help with dosages of these medications. Vitamins & supplements started. Acute respiratory failure, hypoxia: Patient's baseline is room air. Patient is currently requiring 6L of oxygen. Incentive spirometry. Acapella. Dispo: Hopeful for Saturday discharge - Pt will need to require less than 4L while ambulating. Chief Complaint: SOB    Initial H and P:-    Initial H&P \"This is a 31year old male with no significant PMHx who presented to [facility] with SOB and found to be positive for COVID-19. Patient states that his symptoms started approx 8 days ago with cough, fever/chills, fatigue, body aches. He states that this morning he suddenly became very short of breath and came to the hospital. Denies chest pain, abd pain, n/v/d, urinary sx. Upon arrival to the ED, pts SpO2 was 86% and he was placed on 3L NC. Patient will be admitted to hospitalist service for further management.  \"     9/29: No acute events overnight. Patient spiking fever this afternoon. Patient using incentive spirometer and Acapella. Patient states that he is very tired and weak. Subjective (past 24 hours):   No acute events overnight. Patient was weaned to nasal cannula. Patient sitting up in the chair during evaluation. Patient is in good spirits and using incentive spirometer and Acapella. Patient states that he is feeling better. Past medical history, family history, social history and allergies reviewed again and is unchanged since admission.     ROS (All review of systems completed. Pertinent positives noted. Otherwise All other systems reviewed and negative.)     Medications:  Reviewed    Infusion Medications    sodium chloride 100 mL/hr at 09/29/21 1531     Scheduled Medications    dexamethasone  20 mg IntraVENous Q24H    Followed by   Elysia Solorzano ON 10/4/2021] dexamethasone  10 mg IntraVENous Q24H    enoxaparin  30 mg SubCUTAneous BID    Vitamin D  2,000 Units Oral Daily    ascorbic acid  500 mg Oral Daily    zinc sulfate  50 mg Oral Daily    meloxicam  15 mg Oral Daily    baricitinib  4 mg Oral Daily     PRN Meds: benzonatate, dextromethorphan-guaiFENesin, ipratropium-albuterol, acetaminophen **OR** acetaminophen, cyclobenzaprine      Intake/Output Summary (Last 24 hours) at 9/30/2021 1400  Last data filed at 9/30/2021 1359  Gross per 24 hour   Intake 4840.16 ml   Output 400 ml   Net 4440.16 ml       Diet:  ADULT DIET; Regular    Exam:  /70   Pulse 78   Temp 97.9 °F (36.6 °C) (Oral)   Resp 20   Ht 5' 7\" (1.702 m)   Wt 179 lb 10.8 oz (81.5 kg)   SpO2 92%   BMI 28.14 kg/m²   General appearance: No apparent distress, appears stated age and cooperative. HEENT: Pupils equal, round, and reactive to light. Conjunctivae/corneas clear. Neck: Supple, with full range of motion. No jugular venous distention. Trachea midline. Respiratory:  Normal respiratory effort on NC. Breath sounds diminished. Cardiovascular: Regular rate and rhythm with normal S1/S2 without murmurs, rubs or gallops. Abdomen: Soft, non-tender, non-distended with normal bowel sounds. Musculoskeletal: passive and active ROM x 4 extremities. Skin: Skin color, texture, turgor normal.  No rashes or lesions. Neurologic:  Neurovascularly intact without any focal sensory/motor deficits.  Cranial nerves: II-XII intact, grossly non-focal.  Psychiatric: Alert and oriented, thought content appropriate, normal insight  Capillary Refill: Brisk,< 3 seconds   Peripheral Pulses: +2 palpable, equal bilaterally     Labs:   Recent Labs     09/28/21  0905 09/29/21  0645 09/30/21  0701   WBC 13.8* 12.7* 9.3   HGB 15.7 14.9 14.8   HCT 45.8 44.3 43.9    173 213     Recent Labs     09/28/21  0905 09/29/21  0645 09/30/21  0701    136 138   K 4.2 4.2 4.1    103 104   CO2 23 24 25   BUN 20 19 17   CREATININE 0.6 0.6 0.6   CALCIUM 9.0 8.2* 8.9     Recent Labs     09/29/21  0645   AST 34   ALT 50   BILIDIR 0.4*   BILITOT 1.1   ALKPHOS 68     Urinalysis:      Lab Results   Component Value Date    NITRU NEGATIVE 09/27/2021    WBCUA 0-2 09/27/2021    BACTERIA NONE SEEN 09/27/2021    RBCUA 0-2 09/27/2021    BLOODU NEGATIVE 09/27/2021    GLUCOSEU NEGATIVE 09/27/2021       Radiology:  XR CHEST PORTABLE   Final Result   1. Stable airspace disease, no acute changes. **This report has been created using voice recognition software. It may contain minor errors which are inherent in voice recognition technology. **      Final report electronically signed by Dr Vicki Jones on 9/29/2021 11:57 AM      XR CHEST PORTABLE   Final Result   1. Nodular peripheral predominant airspace opacities compatible with atypical infection. **This report has been created using voice recognition software. It may contain minor errors which are inherent in voice recognition technology. **      Final report electronically signed by Dr Vicki Jones on 9/27/2021 11:27 AM            Electronically signed by REJI Campos on 9/30/2021 at 2:00 PM

## 2021-10-01 VITALS
OXYGEN SATURATION: 91 % | TEMPERATURE: 97.3 F | HEART RATE: 75 BPM | HEIGHT: 67 IN | BODY MASS INDEX: 28.2 KG/M2 | WEIGHT: 179.68 LBS | SYSTOLIC BLOOD PRESSURE: 111 MMHG | RESPIRATION RATE: 18 BRPM | DIASTOLIC BLOOD PRESSURE: 77 MMHG

## 2021-10-01 PROCEDURE — 6360000002 HC RX W HCPCS: Performed by: PHYSICIAN ASSISTANT

## 2021-10-01 PROCEDURE — 6370000000 HC RX 637 (ALT 250 FOR IP): Performed by: PHYSICIAN ASSISTANT

## 2021-10-01 PROCEDURE — 2580000003 HC RX 258: Performed by: PHYSICIAN ASSISTANT

## 2021-10-01 PROCEDURE — 94760 N-INVAS EAR/PLS OXIMETRY 1: CPT

## 2021-10-01 PROCEDURE — 2700000000 HC OXYGEN THERAPY PER DAY

## 2021-10-01 PROCEDURE — 99239 HOSP IP/OBS DSCHRG MGMT >30: CPT | Performed by: PHYSICIAN ASSISTANT

## 2021-10-01 RX ORDER — ZINC SULFATE 50(220)MG
50 CAPSULE ORAL DAILY
Qty: 30 CAPSULE | Refills: 3 | COMMUNITY
Start: 2021-10-02

## 2021-10-01 RX ORDER — ASCORBIC ACID 500 MG
500 TABLET ORAL DAILY
Qty: 30 TABLET | Refills: 3 | Status: SHIPPED | OUTPATIENT
Start: 2021-10-02

## 2021-10-01 RX ORDER — DEXAMETHASONE 4 MG/1
10 TABLET ORAL
Qty: 13 TABLET | Refills: 0 | Status: SHIPPED | OUTPATIENT
Start: 2021-10-01 | End: 2021-10-06

## 2021-10-01 RX ADMIN — DEXAMETHASONE SODIUM PHOSPHATE 20 MG: 4 INJECTION, SOLUTION INTRA-ARTICULAR; INTRALESIONAL; INTRAMUSCULAR; INTRAVENOUS; SOFT TISSUE at 13:47

## 2021-10-01 RX ADMIN — ENOXAPARIN SODIUM 30 MG: 30 INJECTION SUBCUTANEOUS at 09:20

## 2021-10-01 RX ADMIN — GUAIFENESIN AND DEXTROMETHORPHAN HYDROBROMIDE 1 TABLET: 600; 30 TABLET, EXTENDED RELEASE ORAL at 09:19

## 2021-10-01 RX ADMIN — Medication 50 MG: at 09:19

## 2021-10-01 RX ADMIN — OXYCODONE HYDROCHLORIDE AND ACETAMINOPHEN 500 MG: 500 TABLET ORAL at 09:19

## 2021-10-01 RX ADMIN — Medication 2000 UNITS: at 09:19

## 2021-10-01 RX ADMIN — BENZONATATE 100 MG: 100 CAPSULE ORAL at 09:19

## 2021-10-01 RX ADMIN — MELOXICAM 15 MG: 7.5 TABLET ORAL at 09:19

## 2021-10-01 NOTE — CARE COORDINATION
10/1/21, 2:06 PM EDT    Patient goals/plan/ treatment preferences discussed by  and . Patient goals/plan/ treatment preferences reviewed with patient/ family. Patient/ family verbalize understanding of discharge plan and are in agreement with goal/plan/treatment preferences. Understanding was demonstrated using the teach back method. AVS provided by RN at time of discharge, which includes all necessary medical information pertaining to the patients current course of illness, treatment, post-discharge goals of care, and treatment preferences. Planning discharge home today with new home O2 through  HME. Referral called to Poly De La Fuente at [employer]. Ashley Figueroa to help with paying. [facility] Department of Health notified of pt's discharge planned for today.

## 2021-10-01 NOTE — PROGRESS NOTES
A home oxygen evaluation has been completed. [x]Patient is an inpatient. It is expected that the patient will be discharged within the next 48 hours. Qualified provider to write order for home prescription if patient qualifies. Social service/care managers will arrange for home oxygen. If patient is active, arrange for Home Medical supplier to assess for Oxygen Conserving Device per pulse oximetry. []Patient is an outpatient. Results will be faxed to the ordering provider. Qualified provider to write order for home prescription if patient qualifies and arranges for home oxygen. Patient was placed on room air for 20 minutes. SpO2 was 90 % on room air at rest. Patients SpO2 was 89% or above and did not qualify for home oxygen. Patient was walked for 6 minutes. SpO2 was 87 % during walking. Patients SpO2 was below 89% and qualified for home oxygen. Oxygen was applied at 2 lpm via nasal cannula to maintain a SpO2 between 90-92% while walking. Actual SpO2 was 90 %.

## 2021-10-01 NOTE — PROGRESS NOTES
Patient was evaluated today for the diagnosis of COVID-19. I entered a DME order for home oxygen because the diagnosis and testing requires the patient to have supplemental oxygen. Condition will improve or be benefited by oxygen use. The patient is  able to perform good mobility in a home setting and therefore does require the use of a portable oxygen system. The need for this equipment was discussed with the patient and he understands and is in agreement.     Electronically signed by REJI Hewitt on 10/1/2021 at 1:03 PM

## 2021-10-02 NOTE — DISCHARGE SUMMARY
Hospitalist Discharge Summary        Patient: Daryl Negrete  YOB: 1985  MRN: [id number]   Acct: [de-identified]    Primary Care Physician: RIKKI Sandhu CNP    Admit date  9/27/2021    Discharge date:  10/1/2021    Chief Complaint on presentation :-  COVID-19    Discharge Assessment and Plan:-   COVID 19: Patient tested positive for COVID-19. Patient was afebrile on DC. Patient completed COVID-19 therapies: Decadron & Baricitinib . Continue Vitamin C, Vitamin D, Zinc.  Patient was prescribed the rest of Decadron treatment of a total of 10 days. Acute respiratory failure, hypoxia: Patient's baseline is room air. The day of discharge, patient still required some oxygen support. Patient received a Home O2 eval, which showed that the patient required 0 L at rest and 2 L while exercising. Initial H and P and Hospital course:-  Initial H&P \"This is a 31year old male with no significant PMHx who presented to [facility] with SOB and found to be positive for COVID-19. Patient states that his symptoms started approx 8 days ago with cough, fever/chills, fatigue, body aches. He states that this morning he suddenly became very short of breath and came to the hospital. Denies chest pain, abd pain, n/v/d, urinary sx. Upon arrival to the ED, pts SpO2 was 86% and he was placed on 3L NC. Patient will be admitted to hospitalist service for further management. \"      9/29: No acute events overnight. Patient spiking fever this afternoon. Patient using incentive spirometer and Acapella. Patient states that he is very tired and weak. 9/30: No acute events overnight. Patient was weaned to nasal cannula. Patient sitting up in the chair during evaluation. Patient is in good spirits and using incentive spirometer and Acapella. Patient states that he is feeling better. Patient was admitted on 9/27. Patient received COVID therapies (Barcinitib & Decadron).   Patient continued to improve and oxygen was weaned. However, Patient still required oxygen the day of discharge. Home O2 eval was completed. Patient received oxygen before discharge. Patient understood that she needed to call the health department to make sure that she was in compliance with quarantine regulations. On the day of discharge, it was explained to the patient that it was very important to follow up with his PCP to have continued care. Appointments were made and information was given. Physical Exam:-  Vitals:   No data found. Weight:   Weight: 179 lb 10.8 oz (81.5 kg)   24 hour intake/output:   No intake or output data in the 24 hours ending 10/02/21 1400    General appearance: No apparent distress, appears stated age and cooperative. HEENT: Normal cephalic, atraumatic without obvious deformity. Pupils equal, round, and reactive to light. Extra ocular muscles intact. Conjunctivae/corneas clear. Neck: Supple, with full range of motion. No jugular venous distention. Trachea midline. Respiratory:  Normal respiratory effort. Clear to auscultation, bilaterally without Rales/Wheezes/Rhonchi. Cardiovascular: Regular rate and rhythm with normal S1/S2 without murmurs, rubs or gallops. Abdomen: Soft, non-tender, non-distended with normal bowel sounds. Musculoskeletal:  No clubbing, cyanosis or edema bilaterally. Skin: Skin color, texture, turgor normal.  No rashes or lesions. Neurologic:  Neurovascularly intact without any focal sensory/motor deficits.  Cranial nerves: II-XII intact, grossly non-focal.  Psychiatric: Alert and oriented, thought content appropriate, normal insight  Capillary Refill: Brisk,< 3 seconds   Peripheral Pulses: +2 palpable, equal bilaterally       Discharge Medications:-      Medication List        START taking these medications      ascorbic acid 500 MG tablet  Commonly known as: VITAMIN C  Take 1 tablet by mouth daily     zinc sulfate 220 (50 Zn) MG capsule  Commonly known as: ZINCATE  Take 1 capsule by mouth daily            CHANGE how you take these medications      dexamethasone 4 MG tablet  Commonly known as: DECADRON  Take 2.5 tablets by mouth daily (with breakfast) for 5 days  What changed:   how much to take  when to take this            CONTINUE taking these medications      cyclobenzaprine 10 MG tablet  Commonly known as: FLEXERIL     meloxicam 15 MG tablet  Commonly known as: MOBIC     vitamin D 50 MCG (2000 UT) Caps capsule            STOP taking these medications      azithromycin 250 MG tablet  Commonly known as: ZITHROMAX               Where to Get Your Medications        These medications were sent to [facility] [address], [address]  [address], [address] [address]      Phone: [phone]   ascorbic acid 500 MG tablet  dexamethasone 4 MG tablet       You can get these medications from any pharmacy    You don't need a prescription for these medications  zinc sulfate 220 (50 Zn) MG capsule          Labs :-  Recent Results (from the past 72 hour(s))   CBC    Collection Time: 09/30/21  7:01 AM   Result Value Ref Range    WBC 9.3 4.8 - 10.8 thou/mm3    RBC 4.90 4.70 - 6.10 mill/mm3    Hemoglobin 14.8 14.0 - 18.0 gm/dl    Hematocrit 43.9 42.0 - 52.0 %    MCV 89.6 80.0 - 94.0 fL    MCH 30.2 26.0 - 33.0 pg    MCHC 33.7 32.2 - 35.5 gm/dl    RDW-CV 12.5 11.5 - 14.5 %    RDW-SD 41.4 35.0 - 45.0 fL    Platelets 301 442 - 662 thou/mm3    MPV 10.5 9.4 - 12.4 fL   Basic Metabolic Panel w/ Reflex to MG    Collection Time: 09/30/21  7:01 AM   Result Value Ref Range    Sodium 138 135 - 145 meq/L    Potassium reflex Magnesium 4.1 3.5 - 5.2 meq/L    Chloride 104 98 - 111 meq/L    CO2 25 23 - 33 meq/L    Glucose 175 (H) 70 - 108 mg/dL    BUN 17 7 - 22 mg/dL    CREATININE 0.6 0.4 - 1.2 mg/dL    Calcium 8.9 8.5 - 10.5 mg/dL   C-reactive protein    Collection Time: 09/30/21  7:01 AM   Result Value Ref Range    CRP 8.43 (H) 0.00 - 1.00 mg/dl   Anion Gap    Collection Time: 09/30/21  7:01 AM   Result Value Ref Range    Anion Gap 9.0 8.0 - 16.0 meq/L   Glomerular Filtration Rate, Estimated    Collection Time: 09/30/21  7:01 AM   Result Value Ref Range    Est, Glom Filt Rate >90 ml/min/1.73m2      Urinalysis:      Lab Results   Component Value Date    NITRU NEGATIVE 09/27/2021    WBCUA 0-2 09/27/2021    BACTERIA NONE SEEN 09/27/2021    RBCUA 0-2 09/27/2021    BLOODU NEGATIVE 09/27/2021    GLUCOSEU NEGATIVE 09/27/2021       Radiology:-  XR CHEST PORTABLE    Result Date: 9/27/2021  PROCEDURE: XR CHEST PORTABLE CLINICAL INFORMATION: covid, shortness of breath COMPARISON: No prior study. TECHNIQUE: AP portable chest radiograph performed. FINDINGS: LINES/TUBES/MECHANICAL DEVICES: None. CARDIAC SILHOUETTE: Cardiac and mediastinal contours are sharp. The trachea is not deviated. LUNGS: Nodular peripheral predominant airspace opacities OTHER: None. OSSEOUS STRUCTURES: 1. No acute osseous abnormality. 1. Nodular peripheral predominant airspace opacities compatible with atypical infection. **This report has been created using voice recognition software. It may contain minor errors which are inherent in voice recognition technology. ** Final report electronically signed by Dr Paul oGnzalez on 9/27/2021 11:27 AM       Follow-up scheduled after discharge :-    in the next few days with RIKKI Ernst - CNP    Consultations during this hospital stay:-  [] NONE [] Cardiology  [] Nephrology  [] Hemo onco   [] GI   [] ID  [] Endocrine  [] Pulm    [] Neuro    [] Psych   [] Urology  [] ENT   [] G SURGERY   []Ortho    []CV surg    [] Palliative  [] Hospice [] Pain management   []    []TCU   [] PT/OT  OTHERS:-    Disposition: home  Condition at Discharge: Stable    Time Spent:- 45 minutes    Electronically signed by REJI Hewitt on 10/2/21 at 2:00 PM EDT   Discharging Hospitalist

## 2021-10-04 ENCOUNTER — CARE COORDINATION (OUTPATIENT)
Dept: CASE MANAGEMENT | Age: 36
End: 2021-10-04

## 2021-10-04 NOTE — CARE COORDINATION
Covid-19 Initial Follow Up Call    Patient contacted regarding COVID-19 risk, exposure and diagnosis. Discussed COVID-19 related testing which was available at this time. Test results were positive. Patient informed of results, if available? Yes. Care Transition Nurse contacted the family by telephone to perform post discharge assessment. Call within 2 business days of discharge: Yes. Verified name and  with family as identifiers. Provided introduction to self, and explanation of the CTN/ACM role, and reason for call due to risk factors for infection and/or exposure to COVID-19. Spoke with wife, Jorge Sharma, said Amisha Peters is doing ok. Denies Covid symptoms. Has a pulse ox and sats 90-93% on RA, is not using O2 right now even with activity. Using IS several times a day and coughs after use. Appetite is better and drinking plenty of fluids. Reviewed medications. Encouraged to pace himself with activities and get plenty of rest.  Is out of quarantine tomorrow. PCP f/u next week. No other questions or concerns at this time. Will continue to follow. Symptoms reviewed with family who verbalized the following symptoms: no new symptoms and no worsening symptoms. Due to no new or worsening symptoms encounter was not routed to provider for escalation. Discussed follow-up appointments. If no appointment was previously scheduled, appointment scheduling offered: na. [facility] follow up appointment(s): No future appointments. Non-[facility] follow up appointment(s): PCP 10/12    Non-face-to-face services provided:  Scheduled appointment with PCP-10/12  Obtained and reviewed discharge summary and/or continuity of care documents     Advance Care Planning:   Does patient have an Advance Directive:  patient declined education. Discussion held while in the hospital.    Educated patient about risk for severe COVID-19 due to risk factors according to CDC guidelines.  CTN reviewed discharge instructions, medical action plan and red flag symptoms with the family who verbalized understanding. Discussed COVID vaccination status: Yes. Education provided on COVID-19 vaccination as appropriate. Discussed exposure protocols and quarantine with CDC Guidelines. Family was given an opportunity to verbalize any questions and concerns and agrees to contact CTN or health care provider for questions related to their healthcare. Reviewed and educated family on any new and changed medications related to discharge diagnosis     Was patient discharged with a pulse oximeter? No, has one at home. Discussed and confirmed pulse oximeter discharge instructions and when to notify provider or seek emergency care. CTN provided contact information. Plan for follow-up call in 5-7 days based on severity of symptoms and risk factors.

## 2021-10-04 NOTE — PROGRESS NOTES
CLINICAL PHARMACY NOTE: MEDS TO BEDS    Total # of Prescriptions Filled: 2   The following medications were delivered to the patient:  Ascorbic Acid 500mg  Dexamethasone 4mg    Additional Documentation:

## 2021-10-12 ENCOUNTER — CARE COORDINATION (OUTPATIENT)
Dept: CASE MANAGEMENT | Age: 36
End: 2021-10-12

## 2021-10-12 NOTE — CARE COORDINATION
Covid-19 Subsequent Follow Up Call    Patient contacted regarding COVID-19 risk, exposure and diagnosis. Discussed COVID-19 related testing which was available at this time. Test results were positive. Patient informed of results, if available? Yes    Care Transition Nurse contacted the patient by telephone to perform follow-up assessment. Verified name and  with patient as identifiers. Patient has following risk factors of: no known risk factors. Spoke with Sonja Lee, said he is doing ok. Has occasional cough but is better. Is on RA, sats staying above 90%. Continues to use IS. Did a VV with PCP today. He is having night sweats and she said it should resolve but if still having in a month, will run some tests. No other questions or concerns at this time. Will continue to follow. Symptoms reviewed with patient who verbalized the following symptoms: no new symptoms and no worsening symptoms. Due to no new or worsening symptoms encounter was not routed to provider for escalation. Educated patient about risk for severe COVID-19 due to risk factors according to CDC guidelines. CTN reviewed discharge instructions, medical action plan and red flag symptoms with the patient who verbalized understanding. Discussed COVID vaccination status: Yes. Education provided on COVID-19 vaccination as appropriate. Discussed exposure protocols and quarantine with CDC Guidelines. Patient was given an opportunity to verbalize any questions and concerns and agrees to contact CTN or health care provider for questions related to their healthcare. Was patient discharged with a pulse oximeter? No, has one at home. Discussed and confirmed pulse oximeter discharge instructions and when to notify provider or seek emergency care. CTN provided contact information. Plan for follow-up call in 5-7 days based on severity of symptoms and risk factors.

## 2021-10-20 ENCOUNTER — CARE COORDINATION (OUTPATIENT)
Dept: CASE MANAGEMENT | Age: 36
End: 2021-10-20

## 2021-10-20 NOTE — CARE COORDINATION
Covid-19 Subsequent Follow Up Call    Patient contacted regarding COVID-19 risk, exposure and diagnosis. Discussed COVID-19 related testing which was available at this time. Test results were positive. Patient informed of results, if available? Yes    Care Transition Nurse contacted the family by telephone to perform follow-up assessment. Verified name and  with family as identifiers. Patient has following risk factors of: no known risk factors. Spoke with wife, Heather, said Oscar Melo is doing good, went back to work a week after discharge. Is not doing his full work load but it is going well. He is no longer using O2 and asked how to get that picked up. Explained that PCP would need to fax order for release to [employer] [facility]. She will call her. No other questions or concerns at this time. Will continue to follow. Symptoms reviewed with family who verbalized the following symptoms: no new symptoms and no worsening symptoms. Due to no new or worsening symptoms encounter was not routed to provider for escalation. Educated patient about risk for severe COVID-19 due to risk factors according to CDC guidelines. CTN reviewed discharge instructions, medical action plan and red flag symptoms with the family who verbalized understanding. Discussed COVID vaccination status: Yes. Education provided on COVID-19 vaccination as appropriate. Discussed exposure protocols and quarantine with CDC Guidelines. Family was given an opportunity to verbalize any questions and concerns and agrees to contact CTN or health care provider for questions related to their healthcare. Was patient discharged with a pulse oximeter? No, has at home. Discussed and confirmed pulse oximeter discharge instructions and when to notify provider or seek emergency care. CTN provided contact information. Plan for follow-up call in 5-7 days based on severity of symptoms and risk factors.

## 2021-10-27 ENCOUNTER — CARE COORDINATION (OUTPATIENT)
Dept: CASE MANAGEMENT | Age: 36
End: 2021-10-27

## 2021-12-07 ENCOUNTER — APPOINTMENT (OUTPATIENT)
Dept: GENERAL RADIOLOGY | Age: 36
End: 2021-12-07
Payer: COMMERCIAL

## 2021-12-07 ENCOUNTER — HOSPITAL ENCOUNTER (EMERGENCY)
Age: 36
Discharge: HOME | End: 2021-12-07
Payer: COMMERCIAL

## 2021-12-07 VITALS
HEART RATE: 107 BPM | SYSTOLIC BLOOD PRESSURE: 159 MMHG | TEMPERATURE: 98.9 F | OXYGEN SATURATION: 99 % | DIASTOLIC BLOOD PRESSURE: 84 MMHG | RESPIRATION RATE: 18 BRPM

## 2021-12-07 DIAGNOSIS — L04.9 ACUTE LYMPHADENITIS: Primary | ICD-10-CM

## 2021-12-07 LAB
ALBUMIN SERPL-MCNC: 4.7 G/DL (ref 3.5–5.1)
ALP BLD-CCNC: 86 U/L (ref 38–126)
ALT SERPL-CCNC: 31 U/L (ref 11–66)
ANION GAP SERPL CALCULATED.3IONS-SCNC: 13 MEQ/L (ref 8–16)
AST SERPL-CCNC: 25 U/L (ref 5–40)
BASOPHILS # BLD: 0.7 %
BASOPHILS ABSOLUTE: 0.1 THOU/MM3 (ref 0–0.1)
BILIRUB SERPL-MCNC: 0.8 MG/DL (ref 0.3–1.2)
BUN BLDV-MCNC: 19 MG/DL (ref 7–22)
C-REACTIVE PROTEIN: 5.43 MG/DL (ref 0–1)
CALCIUM SERPL-MCNC: 9.5 MG/DL (ref 8.5–10.5)
CHLORIDE BLD-SCNC: 99 MEQ/L (ref 98–111)
CO2: 22 MEQ/L (ref 23–33)
CREAT SERPL-MCNC: 0.7 MG/DL (ref 0.4–1.2)
EOSINOPHIL # BLD: 3.1 %
EOSINOPHILS ABSOLUTE: 0.3 THOU/MM3 (ref 0–0.4)
ERYTHROCYTE [DISTWIDTH] IN BLOOD BY AUTOMATED COUNT: 13.1 % (ref 11.5–14.5)
ERYTHROCYTE [DISTWIDTH] IN BLOOD BY AUTOMATED COUNT: 42.3 FL (ref 35–45)
GFR SERPL CREATININE-BSD FRML MDRD: > 90 ML/MIN/1.73M2
GLUCOSE BLD-MCNC: 122 MG/DL (ref 70–108)
HCT VFR BLD CALC: 45.6 % (ref 42–52)
HEMOGLOBIN: 16.2 GM/DL (ref 14–18)
IMMATURE GRANS (ABS): 0.03 THOU/MM3 (ref 0–0.07)
IMMATURE GRANULOCYTES: 0.4 %
LYMPHOCYTES # BLD: 10.8 %
LYMPHOCYTES ABSOLUTE: 0.9 THOU/MM3 (ref 1–4.8)
MCH RBC QN AUTO: 31.2 PG (ref 26–33)
MCHC RBC AUTO-ENTMCNC: 35.5 GM/DL (ref 32.2–35.5)
MCV RBC AUTO: 87.9 FL (ref 80–94)
MONOCYTES # BLD: 11.2 %
MONOCYTES ABSOLUTE: 0.9 THOU/MM3 (ref 0.4–1.3)
NUCLEATED RED BLOOD CELLS: 0 /100 WBC
OSMOLALITY CALCULATION: 271.8 MOSMOL/KG (ref 275–300)
PLATELET # BLD: 196 THOU/MM3 (ref 130–400)
PMV BLD AUTO: 10.2 FL (ref 9.4–12.4)
POTASSIUM SERPL-SCNC: 4.1 MEQ/L (ref 3.5–5.2)
PROCALCITONIN: 0.17 NG/ML (ref 0.01–0.09)
RBC # BLD: 5.19 MILL/MM3 (ref 4.7–6.1)
SEDIMENTATION RATE, ERYTHROCYTE: 11 MM/HR (ref 0–10)
SEG NEUTROPHILS: 73.8 %
SEGMENTED NEUTROPHILS ABSOLUTE COUNT: 6.2 THOU/MM3 (ref 1.8–7.7)
SODIUM BLD-SCNC: 134 MEQ/L (ref 135–145)
TOTAL PROTEIN: 7.6 G/DL (ref 6.1–8)
WBC # BLD: 8.4 THOU/MM3 (ref 4.8–10.8)

## 2021-12-07 PROCEDURE — 36415 COLL VENOUS BLD VENIPUNCTURE: CPT

## 2021-12-07 PROCEDURE — 85025 COMPLETE CBC W/AUTO DIFF WBC: CPT

## 2021-12-07 PROCEDURE — 84145 PROCALCITONIN (PCT): CPT

## 2021-12-07 PROCEDURE — 86592 SYPHILIS TEST NON-TREP QUAL: CPT

## 2021-12-07 PROCEDURE — 99282 EMERGENCY DEPT VISIT SF MDM: CPT

## 2021-12-07 PROCEDURE — 6370000000 HC RX 637 (ALT 250 FOR IP): Performed by: PHYSICIAN ASSISTANT

## 2021-12-07 PROCEDURE — 73080 X-RAY EXAM OF ELBOW: CPT

## 2021-12-07 PROCEDURE — 86140 C-REACTIVE PROTEIN: CPT

## 2021-12-07 PROCEDURE — 85651 RBC SED RATE NONAUTOMATED: CPT

## 2021-12-07 PROCEDURE — 80053 COMPREHEN METABOLIC PANEL: CPT

## 2021-12-07 RX ORDER — IBUPROFEN 800 MG/1
800 TABLET ORAL ONCE
Status: COMPLETED | OUTPATIENT
Start: 2021-12-07 | End: 2021-12-07

## 2021-12-07 RX ADMIN — IBUPROFEN 800 MG: 800 TABLET, FILM COATED ORAL at 18:52

## 2021-12-07 ASSESSMENT — ENCOUNTER SYMPTOMS
PHOTOPHOBIA: 0
VOMITING: 0
CHEST TIGHTNESS: 0
NAUSEA: 0
SHORTNESS OF BREATH: 0
COUGH: 0
RHINORRHEA: 0
CONSTIPATION: 0
DIARRHEA: 0

## 2021-12-07 ASSESSMENT — PAIN SCALES - GENERAL: PAINLEVEL_OUTOF10: 8

## 2021-12-08 LAB — RPR: NONREACTIVE

## 2021-12-08 NOTE — ED PROVIDER NOTES
[facility] EMERGENCY DEPT      CHIEF COMPLAINT       Chief Complaint   Patient presents with    Mass     rt elbow       Nurses Notes reviewed and I agree except as noted in the HPI. HISTORY OF PRESENT ILLNESS    Riley Norman is a 31 y.o. male who presents for a lump on the right elbow. Patient states this lump has grown over the past week, and has seemingly has spread to the axillary region as well. He states both lumps are tender to the touch, and during certain movements. He has had episodes of numbness and tingling in the right hand inconsistently. He states he has experienced some feverish feelings over the last day. Patient states he has had no cough, sore throat, night sweats, recent injury, abdominal pain, or recent weight loss. There was no injury. Patient had COVID 19 at the end of September. Location/Symptom: lump on the right medial elbow  Timing/Onset: showed up a week ago  Context/Setting: Spontaneous onset with no inciting event  Quality: achy pain  Duration: past week  Modifying Factors: Tender to palpation  Severity: Moderate    REVIEW OF SYSTEMS     Review of Systems   Constitutional: Negative for activity change, appetite change, diaphoresis, fever and unexpected weight change. HENT: Negative for congestion, postnasal drip, rhinorrhea and sore throat. Eyes: Negative for photophobia. Respiratory: Negative for cough, chest tightness and shortness of breath. Cardiovascular: Negative for chest pain. Gastrointestinal: Negative for abdominal pain, constipation, diarrhea, nausea and vomiting. Genitourinary: Negative for difficulty urinating. Musculoskeletal: Positive for joint swelling. Negative for gait problem. Skin: Negative for rash and wound. Neurological: Negative for dizziness, weakness, light-headedness, numbness and headaches. Hematological: Positive for adenopathy. Psychiatric/Behavioral: Negative for confusion.         PAST MEDICAL HISTORY    has no past medical history on file. SURGICAL HISTORY      has no past surgical history on file. CURRENT MEDICATIONS       Discharge Medication List as of 12/7/2021  9:05 PM      CONTINUE these medications which have NOT CHANGED    Details   ascorbic acid (VITAMIN C) 500 MG tablet Take 1 tablet by mouth daily, Disp-30 tablet, R-3Normal      zinc sulfate (ZINCATE) 220 (50 Zn) MG capsule Take 1 capsule by mouth daily, Disp-30 capsule, R-3OTC      meloxicam (MOBIC) 15 MG tablet Take 15 mg by mouth dailyHistorical Med      Cholecalciferol (VITAMIN D) 50 MCG (2000 UT) CAPS capsule Take by mouthHistorical Med      cyclobenzaprine (FLEXERIL) 10 MG tablet Take 10 mg by mouth 3 times daily as needed for Muscle spasmsHistorical Med             ALLERGIES     has No Known Allergies. FAMILY HISTORY     He indicated that the status of his neg hx is unknown.   family history is not on file. SOCIAL HISTORY    reports that he has never smoked. He has never used smokeless tobacco. He reports previous alcohol use. He reports previous drug use. PHYSICAL EXAM     INITIAL VITALS:  oral temperature is 98.9 °F (37.2 °C). His blood pressure is 159/84 (abnormal) and his pulse is 107. His respiration is 18 and oxygen saturation is 99%. Physical Exam  Vitals and nursing note reviewed. Constitutional:       General: He is awake. He is not in acute distress. Appearance: Normal appearance. He is well-developed. He is not toxic-appearing or diaphoretic. HENT:      Head: Normocephalic and atraumatic. Right Ear: Hearing normal.      Left Ear: Hearing normal.      Nose: Nose normal.   Eyes:      General: Lids are normal.      Extraocular Movements: Extraocular movements intact. Conjunctiva/sclera: Conjunctivae normal.   Neck:      Thyroid: No thyromegaly or thyroid tenderness. Trachea: Trachea normal. No tracheal tenderness or tracheal deviation. Cardiovascular:      Rate and Rhythm: Normal rate and regular rhythm. Pulses: Normal pulses. Radial pulses are 2+ on the right side and 2+ on the left side. Heart sounds: Normal heart sounds. No murmur heard. No friction rub. No gallop. Pulmonary:      Effort: Pulmonary effort is normal. No tachypnea or respiratory distress. Breath sounds: Normal breath sounds. No stridor. No wheezing or rales. Chest:   Breasts:      Right: Axillary adenopathy present. No supraclavicular adenopathy. Left: No axillary adenopathy or supraclavicular adenopathy. Abdominal:      General: Bowel sounds are normal. There is no distension. Palpations: Abdomen is soft. Tenderness: There is no abdominal tenderness. Musculoskeletal:      Right shoulder: Normal pulse. Left shoulder: Normal pulse. Right elbow: Swelling present. Tenderness present in medial epicondyle. Left elbow: Normal.      Right wrist: Normal pulse. Left wrist: Normal pulse. Cervical back: No rigidity. Lymphadenopathy:      Head:      Right side of head: No submental, submandibular, tonsillar, preauricular, posterior auricular or occipital adenopathy. Left side of head: No submental, submandibular, tonsillar, preauricular, posterior auricular or occipital adenopathy. Cervical: No cervical adenopathy. Right cervical: No superficial, deep or posterior cervical adenopathy. Left cervical: No superficial, deep or posterior cervical adenopathy. Upper Body:      Right upper body: Axillary adenopathy and epitrochlear adenopathy present. No supraclavicular or pectoral adenopathy. Left upper body: No supraclavicular, axillary, pectoral or epitrochlear adenopathy. Skin:     General: Skin is warm and dry. Capillary Refill: Capillary refill takes less than 2 seconds. Coloration: Skin is not pale. Findings: No abrasion, ecchymosis or rash. Neurological:      Mental Status: He is alert and oriented to person, place, and time.       GCS: GCS eye subscore is 4. GCS verbal subscore is 5. GCS motor subscore is 6. Sensory: No sensory deficit. Gait: Gait normal.   Psychiatric:         Speech: Speech normal.         Behavior: Behavior normal. Behavior is cooperative. Thought Content: Thought content normal.         DIFFERENTIAL DIAGNOSIS:   Including but not limited to: Lymphadenopathy, Cyst, Abscess, hematoma, carcinoma, lymphoma, syphilis    DIAGNOSTIC RESULTS     EKG: All EKG's are interpreted [facility] who either signs or Co-signs this chart in the absence of a cardiologist.  None    RADIOLOGY: non-plain film images(s) such as CT,Ultrasound and MRI are read by the radiologist.  Plain radiographic images are visualized and preliminarily interpreted by the emergency physician unless otherwise stated below. XR ELBOW RIGHT (MIN 3 VIEWS)   Final Result   No fracture or dislocation. **This report has been created using voice recognition software. It may contain minor errors which are inherent in voice recognition technology. **      Final report electronically signed by Dr Amy Jean on 12/7/2021 7:26 PM          LABS:   Labs Reviewed   CBC WITH AUTO DIFFERENTIAL - Abnormal; Notable for the following components:       Result Value    Lymphocytes Absolute 0.9 (*)     All other components within normal limits   COMPREHENSIVE METABOLIC PANEL - Abnormal; Notable for the following components:    Glucose 122 (*)     Sodium 134 (*)     CO2 22 (*)     All other components within normal limits   SEDIMENTATION RATE - Abnormal; Notable for the following components:    Sed Rate 11 (*)     All other components within normal limits   C-REACTIVE PROTEIN - Abnormal; Notable for the following components:    CRP 5.43 (*)     All other components within normal limits   PROCALCITONIN - Abnormal; Notable for the following components:    Procalcitonin 0.17 (*)     All other components within normal limits   OSMOLALITY - Abnormal; Notable for the following components:    Osmolality Calc 271.8 (*)     All other components within normal limits   RPR   ANION GAP   GLOMERULAR FILTRATION RATE, ESTIMATED       EMERGENCY DEPARTMENT COURSE:   Vitals:    Vitals:    12/07/21 1813 12/07/21 1814   BP:  (!) 159/84   Pulse: 107    Resp: 18    Temp: 98.9 °F (37.2 °C)    TempSrc: Oral    SpO2: 99%        Patient was seen in the emergency department during the global pandemic, when there was surge capacity and regional healthcare crisis. MDM:  Patient was seen by me in the intake area for swelling to the medial aspect of the right elbow. Physical exam revealed a neurovascularly intact patient who is nontoxic-appearing. There was a palpable mass to the medial aspect of the right elbow possibly consistent with large lymphadenitis. There was palpable lymphadenopathy in the right axillary area. Vital signs reviewed and noted to be stable. Old records were reviewed. Labs and imaging were ordered and reviewed upon completion. No acute abnormalities found on testing that resulted today. I discussed this patient with my attending physician, Dr. Radha Valencia, who aided in medical decision making and agreed discharge was appropriate. There is no emergent condition requiring further testing or admission at this time. However follow-up was highly stressed with patient's provider. Patient and wife were comfortable with plan of care. I have given the patient and his wife strict written and verbal instructions about care at home, follow-up, and signs and symptoms of worsening of condition and they did verbalize understanding. CRITICAL CARE:   None    CONSULTS:  None    PROCEDURES:  None    FINAL IMPRESSION      1. Acute lymphadenitis          DISPOSITION/PLAN     1.  Acute lymphadenitis        PATIENT REFERRED TO:  RIKKI Zee - CNP  [address]  [phone]    Schedule an appointment as soon as possible for a visit DISCHARGE MEDICATIONS:  Discharge Medication List as of 12/7/2021  9:05 PM          (Please note that portions of this note were completed with a voice recognition program.  Efforts were made to edit the dictations but occasionally words are mis-transcribed.)    Kathlean Landau, PA-C 12/13/21 10:56 AM    Kathlean Landau, PA-C Kathlean Landau, PA-C  12/13/21 1101

## 2021-12-13 ASSESSMENT — ENCOUNTER SYMPTOMS
ABDOMINAL PAIN: 0
SORE THROAT: 0